# Patient Record
Sex: FEMALE | Race: WHITE | Employment: STUDENT | URBAN - METROPOLITAN AREA
[De-identification: names, ages, dates, MRNs, and addresses within clinical notes are randomized per-mention and may not be internally consistent; named-entity substitution may affect disease eponyms.]

---
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
## 2022-01-01 NOTE — PROGRESS NOTES
Attending Note:   I have examined this patient and have reviewed the clinical presentation and progress note with the fellow. I agree with the treatment plan as outlined. The plan was formulated with the fellow on the day of the patient's visit.  Jany Gallo MD, CAQ, CCD  [facility]  Sports Medicine and Bone Health

## 2022-01-02 DIAGNOSIS — U07.1 CLINICAL DIAGNOSIS OF COVID-19: ICD-10-CM

## 2022-01-06 ENCOUNTER — LAB (OUTPATIENT)
Dept: LAB | Facility: CLINIC | Age: 22
End: 2022-01-06
Attending: FAMILY MEDICINE
Payer: COMMERCIAL

## 2022-01-06 DIAGNOSIS — U07.1 CLINICAL DIAGNOSIS OF COVID-19: ICD-10-CM

## 2022-01-06 LAB
ATRIAL RATE - MUSE: 65 BPM
DIASTOLIC BLOOD PRESSURE - MUSE: NORMAL MMHG
INTERPRETATION ECG - MUSE: NORMAL
P AXIS - MUSE: 8 DEGREES
PR INTERVAL - MUSE: 152 MS
QRS DURATION - MUSE: 72 MS
QT - MUSE: 374 MS
QTC - MUSE: 388 MS
R AXIS - MUSE: 78 DEGREES
SYSTOLIC BLOOD PRESSURE - MUSE: NORMAL MMHG
T AXIS - MUSE: 36 DEGREES
TROPONIN I SERPL HS-MCNC: <3 NG/L
VENTRICULAR RATE- MUSE: 65 BPM

## 2022-01-06 PROCEDURE — 36415 COLL VENOUS BLD VENIPUNCTURE: CPT | Performed by: PATHOLOGY

## 2022-01-06 PROCEDURE — 84484 ASSAY OF TROPONIN QUANT: CPT | Performed by: PATHOLOGY

## 2022-01-07 ENCOUNTER — OFFICE VISIT (OUTPATIENT)
Dept: FAMILY MEDICINE | Facility: CLINIC | Age: 22
End: 2022-01-07
Payer: COMMERCIAL

## 2022-01-07 VITALS — HEIGHT: 60 IN | BODY MASS INDEX: 22.62 KG/M2

## 2022-01-07 DIAGNOSIS — U07.1 CLINICAL DIAGNOSIS OF COVID-19: Primary | ICD-10-CM

## 2022-01-07 NOTE — PROGRESS NOTES
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     Tested positive on 12/31. She has only had some congestion which is improving.  No fevers or chills. No chest pain or shortness of breath. No loss of taste, loss of smell, nausea, vomiting or diarrhea.    She does have asthma and has been doing her nebulizer and she has been good about taking it. She hasn't been any extra rescue inhaler and doesn't feel her asthma has flared at all.     She previously had COVID19 in September 2021 and had chest pain after that. She had an echo and Cardiac MRI which were normal. She has not had any recurrent or new chest pain.     Vaccination: Not vaccinated.       PMH, Medications and Allergies were reviewed and updated as needed.    ROS:  As noted above in HPI otherwise negative.    There is no problem list on file for this patient.      Current Outpatient Medications   Medication Sig Dispense Refill     amphetamine-dextroamphetamine (ADDERALL XR) 15 MG 24 hr capsule Take 1 capsule (15 mg) by mouth daily 31 capsule 0     amphetamine-dextroamphetamine (ADDERALL XR) 5 MG 24 hr capsule Take 1 capsule (5 mg) by mouth every morning 31 capsule 0     meloxicam (MOBIC) 15 MG tablet Take 1 tablet (15 mg) by mouth daily for 14 days 14 tablet 0              OBJECTIVE:     Ht 1.524 m (5')   LMP  (LMP Unknown)   BMI 22.62 kg/m    Estimated body mass index is 22.62 kg/m  as calculated from the following:    Height as of this encounter: 1.524 m (5').    Weight as of 12/20/21: 52.5 kg (115 lb 12.8 oz).    GENERAL: Healthy, alert and no distress  EYES: Eyes grossly normal to inspection.  No discharge or erythema, or obvious scleral/conjunctival abnormalities.  Cardiac: Regular rate and rhythm. No murmer, rubs, or gallops.  RESP: No wheeze, rhonchi or rales. No  cough, or visible cyanosis.  No visible retractions or increased work of breathing.    SKIN: Visible skin clear. No significant rash, abnormal pigmentation or lesions.  NEURO: Cranial nerves grossly intact.  Mentation and speech appropriate for age.  PSYCH: Mentation appears normal, affect normal/bright, judgement and insight intact, normal speech and appearance well-groomed.    Troponin I High Sensitivity <54 ng/L <3        EKG: Sinus rhythm   Septal infarct , age undetermined   Abnormal ECG   When compared with ECG of 14-SEP-2021 11:37,   No significant change was found   Confirmed by MD LILA, ORIN ([id number]) on 1/6/2022 10:13:11 PM           ASSESSMENT & PLAN:      Shena was seen today for covid concern.    Diagnoses and all orders for this visit:    Clinical diagnosis of COVID-19: Mild disease in unvaccinated athlete with recent COVID in September. EKG read as possible septal infaract however it appears normal and is unchanged from previous. After previous EKG athlete did have echo and cardiac MRI which were normal. Suspect automated read was incorrect on the EKG.   - Done with Isolation   - Laboratory work negative as noted above including EKG and Troponin  - bike test  - continue masking through day 10 from positive test date    Return to clinic following above testing to review results. Return sooner if develops new or worsening symptoms.    Options for treatment and/or follow-up care were reviewed with the patient and , Sandra Trent ATC. Patient was actively involved in the decision making process. Patient verbalized understanding and was in agreement with the plan.    The patient was discussed with Jany Bray MD Diana Cowdrey, MD

## 2022-01-07 NOTE — LETTER
Date:January 10, 2022      Patient was self referred, no letter generated. Do not send.        [facility] Health Information

## 2022-01-07 NOTE — LETTER
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     Tested positive on 12/31. She has only had some congestion which is improving.  No fevers or chills. No chest pain or shortness of breath. No loss of taste, loss of smell, nausea, vomiting or diarrhea.    She does have asthma and has been doing her nebulizer and she has been good about taking it. She hasn't been any extra rescue inhaler and doesn't feel her asthma has flared at all.     She previously had COVID19 in September 2021 and had chest pain after that. She had an echo and Cardiac MRI which were normal. She has not had any recurrent or new chest pain.     Vaccination: Not vaccinated.       PMH, Medications and Allergies were reviewed and updated as needed.    ROS:  As noted above in HPI otherwise negative.    There is no problem list on file for this patient.      Current Outpatient Medications   Medication Sig Dispense Refill     amphetamine-dextroamphetamine (ADDERALL XR) 15 MG 24 hr capsule Take 1 capsule (15 mg) by mouth daily 31 capsule 0     amphetamine-dextroamphetamine (ADDERALL XR) 5 MG 24 hr capsule Take 1 capsule (5 mg) by mouth every morning 31 capsule 0     meloxicam (MOBIC) 15 MG tablet Take 1 tablet (15 mg) by mouth daily for 14 days 14 tablet 0              OBJECTIVE:     Ht 1.524 m (5')   LMP  (LMP Unknown)   BMI 22.62 kg/m    Estimated body mass index is 22.62 kg/m  as calculated from the following:    Height as of this encounter: 1.524 m (5').    Weight as of 12/20/21: 52.5 kg (115 lb 12.8 oz).    GENERAL: Healthy, alert and no distress  EYES: Eyes grossly normal to inspection.  No discharge or erythema, or obvious scleral/conjunctival abnormalities.  Cardiac: Regular rate and  rhythm. No murmer, rubs, or gallops.  RESP: No wheeze, rhonchi or rales. No cough, or visible cyanosis.  No visible retractions or increased work of breathing.    SKIN: Visible skin clear. No significant rash, abnormal pigmentation or lesions.  NEURO: Cranial nerves grossly intact.  Mentation and speech appropriate for age.  PSYCH: Mentation appears normal, affect normal/bright, judgement and insight intact, normal speech and appearance well-groomed.    Troponin I High Sensitivity <54 ng/L <3        EKG: Sinus rhythm   Septal infarct , age undetermined   Abnormal ECG   When compared with ECG of 14-SEP-2021 11:37,   No significant change was found   Confirmed by MD LILA, ORIN ([id number]) on 1/6/2022 10:13:11 PM           ASSESSMENT & PLAN:      Shena was seen today for covid concern.    Diagnoses and all orders for this visit:    Clinical diagnosis of COVID-19: Mild disease in unvaccinated athlete with recent COVID in September. EKG read as possible septal infaract however it appears normal and is unchanged from previous. After previous EKG athlete did have echo and cardiac MRI which were normal. Suspect automated read was incorrect on the EKG.   - Done with Isolation   - Laboratory work negative as noted above including EKG and Troponin  - bike test  - continue masking through day 10 from positive test date    Return to clinic following above testing to review results. Return sooner if develops new or worsening symptoms.    Options for treatment and/or follow-up care were reviewed with the patient and , Sandra Trent ATC. Patient was actively involved in the decision making process. Patient verbalized understanding and was in agreement with the plan.    The patient was discussed with Jany Bray MD Diana Cowdrey, MD    Attending Note:   I have discussed this patient and have reviewed the clinical presentation and progress note with the fellow. I agree with the treatment plan as  outlined. The plan was formulated with the fellow on the day of the patient's visit.   Jany Gallo MD, CAQ, CCD  [facility]  Sports Medicine and Bone Health      Margarita Diego MD

## 2022-01-09 NOTE — PROGRESS NOTES
Attending Note:   I have discussed this patient and have reviewed the clinical presentation and progress note with the fellow. I agree with the treatment plan as outlined. The plan was formulated with the fellow on the day of the patient's visit.   Jany Gallo MD, CAQ, CCD  [facility]  Sports Medicine and Bone Health

## 2022-01-18 DIAGNOSIS — F90.9 ATTENTION DEFICIT HYPERACTIVITY DISORDER (ADHD), UNSPECIFIED ADHD TYPE: ICD-10-CM

## 2022-01-18 RX ORDER — DEXTROAMPHETAMINE SACCHARATE, AMPHETAMINE ASPARTATE MONOHYDRATE, DEXTROAMPHETAMINE SULFATE AND AMPHETAMINE SULFATE 3.75; 3.75; 3.75; 3.75 MG/1; MG/1; MG/1; MG/1
15 CAPSULE, EXTENDED RELEASE ORAL DAILY
Qty: 31 CAPSULE | Refills: 0 | Status: SHIPPED | OUTPATIENT
Start: 2022-01-18 | End: 2022-09-07

## 2022-01-31 ENCOUNTER — OFFICE VISIT (OUTPATIENT)
Dept: FAMILY MEDICINE | Facility: CLINIC | Age: 22
End: 2022-01-31
Payer: COMMERCIAL

## 2022-01-31 VITALS
DIASTOLIC BLOOD PRESSURE: 77 MMHG | BODY MASS INDEX: 23.4 KG/M2 | HEIGHT: 60 IN | SYSTOLIC BLOOD PRESSURE: 109 MMHG | HEART RATE: 85 BPM | WEIGHT: 119.2 LBS

## 2022-01-31 DIAGNOSIS — F90.9 ATTENTION DEFICIT HYPERACTIVITY DISORDER (ADHD), UNSPECIFIED ADHD TYPE: ICD-10-CM

## 2022-01-31 RX ORDER — DEXTROAMPHETAMINE SACCHARATE, AMPHETAMINE ASPARTATE MONOHYDRATE, DEXTROAMPHETAMINE SULFATE AND AMPHETAMINE SULFATE 3.75; 3.75; 3.75; 3.75 MG/1; MG/1; MG/1; MG/1
15 CAPSULE, EXTENDED RELEASE ORAL DAILY
Qty: 30 CAPSULE | Refills: 0 | Status: SHIPPED | OUTPATIENT
Start: 2022-03-03 | End: 2022-09-13 | Stop reason: DRUGHIGH

## 2022-01-31 RX ORDER — DEXTROAMPHETAMINE SACCHARATE, AMPHETAMINE ASPARTATE MONOHYDRATE, DEXTROAMPHETAMINE SULFATE AND AMPHETAMINE SULFATE 3.75; 3.75; 3.75; 3.75 MG/1; MG/1; MG/1; MG/1
15 CAPSULE, EXTENDED RELEASE ORAL DAILY
Qty: 30 CAPSULE | Refills: 0 | Status: SHIPPED | OUTPATIENT
Start: 2022-04-03 | End: 2022-09-13

## 2022-01-31 RX ORDER — DEXTROAMPHETAMINE SACCHARATE, AMPHETAMINE ASPARTATE MONOHYDRATE, DEXTROAMPHETAMINE SULFATE AND AMPHETAMINE SULFATE 3.75; 3.75; 3.75; 3.75 MG/1; MG/1; MG/1; MG/1
15 CAPSULE, EXTENDED RELEASE ORAL DAILY
Qty: 30 CAPSULE | Refills: 0 | Status: SHIPPED | OUTPATIENT
Start: 2022-01-31 | End: 2022-09-13 | Stop reason: DRUGHIGH

## 2022-01-31 ASSESSMENT — MIFFLIN-ST. JEOR: SCORE: 1237.19

## 2022-01-31 NOTE — PROGRESS NOTES
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  -Doing well on it. It may be a little less effective by the end of the day, but she is still able to concentrate and is happy with how it is working for her.    -Classes going well.  -No problems with her appetite and wt is stable  -No racing heart or funny heartbeats.    Current Outpatient Medications   Medication     amphetamine-dextroamphetamine (ADDERALL XR) 15 MG 24 hr capsule     [START ON 3/3/2022] amphetamine-dextroamphetamine (ADDERALL XR) 15 MG 24 hr capsule     [START ON 4/3/2022] amphetamine-dextroamphetamine (ADDERALL XR) 15 MG 24 hr capsule     amphetamine-dextroamphetamine (ADDERALL XR) 15 MG 24 hr capsule     amphetamine-dextroamphetamine (ADDERALL XR) 5 MG 24 hr capsule     meloxicam (MOBIC) 15 MG tablet     No current facility-administered medications for this visit.     PMH:  No change since her last visit    O: NAD  /77   Pulse 85   Ht 1.524 m (5')   Wt 54.1 kg (119 lb 3.2 oz)   LMP  (LMP Unknown)   BMI 23.28 kg/m    Heart: rrr with m/g  Lungs; CTA      A:  ADHD:  Doing well on Adderall 15 mg XR q day    P:  Refill for one month supply with two additional refills.     Sandra Trent ATC, was present for the entire appt.     Jany Gallo MD, CAQ, FACSM, CCD  [facility]  Sports Medicine and Bone Health  Team Physician;  Athletics

## 2022-01-31 NOTE — LETTER
Date:February 1, 2022      Patient was self referred, no letter generated. Do not send.        [facility] Health Information

## 2022-04-26 ENCOUNTER — VIRTUAL VISIT (OUTPATIENT)
Dept: FAMILY MEDICINE | Facility: CLINIC | Age: 22
End: 2022-04-26
Payer: COMMERCIAL

## 2022-04-26 DIAGNOSIS — F90.9 ATTENTION DEFICIT HYPERACTIVITY DISORDER (ADHD), UNSPECIFIED ADHD TYPE: ICD-10-CM

## 2022-04-26 RX ORDER — DEXTROAMPHETAMINE SACCHARATE, AMPHETAMINE ASPARTATE, DEXTROAMPHETAMINE SULFATE AND AMPHETAMINE SULFATE 3.75; 3.75; 3.75; 3.75 MG/1; MG/1; MG/1; MG/1
15 TABLET ORAL 2 TIMES DAILY
Qty: 60 TABLET | Refills: 0 | Status: SHIPPED | OUTPATIENT
Start: 2022-06-27 | End: 2022-09-13

## 2022-04-26 RX ORDER — DEXTROAMPHETAMINE SACCHARATE, AMPHETAMINE ASPARTATE, DEXTROAMPHETAMINE SULFATE AND AMPHETAMINE SULFATE 3.75; 3.75; 3.75; 3.75 MG/1; MG/1; MG/1; MG/1
15 TABLET ORAL 2 TIMES DAILY
Qty: 60 TABLET | Refills: 0 | Status: SHIPPED | OUTPATIENT
Start: 2022-05-27 | End: 2022-09-13

## 2022-04-26 RX ORDER — DEXTROAMPHETAMINE SACCHARATE, AMPHETAMINE ASPARTATE, DEXTROAMPHETAMINE SULFATE AND AMPHETAMINE SULFATE 3.75; 3.75; 3.75; 3.75 MG/1; MG/1; MG/1; MG/1
15 TABLET ORAL 2 TIMES DAILY
Qty: 60 TABLET | Refills: 0 | Status: SHIPPED | OUTPATIENT
Start: 2022-04-26 | End: 2022-09-13

## 2022-04-26 NOTE — LETTER
4/26/2022      RE: Shena Gomez  [address] [place] [id number]     Dear Colleague,    Thank you for referring your patient, Shena Gomez, to the [facility] ATHLETIC [facility]IN[facility]. Please see a copy of my visit note below.      Shena is a 21 year old who is being evaluated via a billable video visit.      How would you like to obtain your AVS? Per ATC  If the video visit is dropped, the invitation should be resent by: ATC  Will anyone else be joining your video visit? Sandra Trent ATC      Video Start Time: 2:10      Video-Visit Details    Type of service:  Video Visit    Video End Time: 2:20    Originating Location (pt. Location): home  Distant Location (provider location):  [facility] ATHLETIC [facility]     Platform used for Video Visit: [employer]    VIDEO APPOINTMENT    S:  F/u ADHD.   -Taking Adderall 15 mg XR and feels like it doesn't last long enough.    -No side effects  -Sick at home today with a stomach bug (neg COVID test) so could only do a video visit. She is out of meds.   -Adderall doesn't suppress her appetite much  -Wt has been stable      O:  NAD  No vital signs taken due to a video visit      A:  ADHD  P:  Will switch to Adderall 15 mg BID and stop the XR version.   Refills given.  Will need to have updated vital signs with ATC and she can stop by the training room clinic and I will listen to her heart.       Sandra Trent ATC for  Gymnastics was present for the entire video appt.     Jany Gallo MD, CAQ, FACSM, CCD  [facility]  Sports Medicine and Bone Health  Team Physician;  Athletics        Again, thank you for allowing me to participate in the care of your patient.      Sincerely,    Jany Gallo MD

## 2022-04-26 NOTE — LETTER
Date:May 2, 2022      Patient was self referred, no letter generated. Do not send.        [facility] Health Information

## 2022-05-02 NOTE — PROGRESS NOTES
Shena is a 21 year old who is being evaluated via a billable video visit.      How would you like to obtain your AVS? Per ATC  If the video visit is dropped, the invitation should be resent by: ATC  Will anyone else be joining your video visit? Sandra Trent ATC      Video Start Time: 2:10      Video-Visit Details    Type of service:  Video Visit    Video End Time: 2:20    Originating Location (pt. Location): home  Distant Location (provider location):  [facility]     Platform used for Video Visit: [employer]    VIDEO APPOINTMENT    S:  F/u ADHD.   -Taking Adderall 15 mg XR and feels like it doesn't last long enough.    -No side effects  -Sick at home today with a stomach bug (neg COVID test) so could only do a video visit. She is out of meds.   -Adderall doesn't suppress her appetite much  -Wt has been stable      O:  NAD  No vital signs taken due to a video visit      A:  ADHD  P:  Will switch to Adderall 15 mg BID and stop the XR version.   Refills given.  Will need to have updated vital signs with ATC and she can stop by the training room clinic and I will listen to her heart.       Sandra Trent ATC for  Gymnastics was present for the entire video appt.     Jany Gallo MD, CAQ, FACSM, CCD  [facility]  Sports Medicine and Bone Health  Team Physician;  Athletics

## 2022-09-07 DIAGNOSIS — F90.9 ATTENTION DEFICIT HYPERACTIVITY DISORDER (ADHD), UNSPECIFIED ADHD TYPE: ICD-10-CM

## 2022-09-07 RX ORDER — DEXTROAMPHETAMINE SACCHARATE, AMPHETAMINE ASPARTATE MONOHYDRATE, DEXTROAMPHETAMINE SULFATE AND AMPHETAMINE SULFATE 3.75; 3.75; 3.75; 3.75 MG/1; MG/1; MG/1; MG/1
15 CAPSULE, EXTENDED RELEASE ORAL DAILY
Qty: 31 CAPSULE | Refills: 0 | Status: SHIPPED | OUTPATIENT
Start: 2022-09-07 | End: 2022-09-13

## 2022-09-21 DIAGNOSIS — F90.9 ATTENTION DEFICIT HYPERACTIVITY DISORDER (ADHD), UNSPECIFIED ADHD TYPE: ICD-10-CM

## 2022-09-21 DIAGNOSIS — F90.9 ATTENTION DEFICIT HYPERACTIVITY DISORDER (ADHD), UNSPECIFIED ADHD TYPE: Primary | ICD-10-CM

## 2022-09-21 RX ORDER — DEXTROAMPHETAMINE SACCHARATE, AMPHETAMINE ASPARTATE, DEXTROAMPHETAMINE SULFATE AND AMPHETAMINE SULFATE 3.75; 3.75; 3.75; 3.75 MG/1; MG/1; MG/1; MG/1
15 TABLET ORAL DAILY
Qty: 30 TABLET | Refills: 0 | Status: CANCELLED | OUTPATIENT
Start: 2022-11-22 | End: 2022-12-22

## 2022-09-21 RX ORDER — DEXTROAMPHETAMINE SACCHARATE, AMPHETAMINE ASPARTATE, DEXTROAMPHETAMINE SULFATE AND AMPHETAMINE SULFATE 3.75; 3.75; 3.75; 3.75 MG/1; MG/1; MG/1; MG/1
15 TABLET ORAL DAILY
Qty: 30 TABLET | Refills: 0 | Status: CANCELLED | OUTPATIENT
Start: 2022-10-22 | End: 2022-11-21

## 2022-09-21 RX ORDER — DEXTROAMPHETAMINE SACCHARATE, AMPHETAMINE ASPARTATE, DEXTROAMPHETAMINE SULFATE AND AMPHETAMINE SULFATE 3.75; 3.75; 3.75; 3.75 MG/1; MG/1; MG/1; MG/1
15 TABLET ORAL 2 TIMES DAILY
Qty: 62 TABLET | Refills: 0 | Status: CANCELLED | OUTPATIENT
Start: 2022-09-21 | End: 2022-10-21

## 2022-09-21 RX ORDER — DEXTROAMPHETAMINE SACCHARATE, AMPHETAMINE ASPARTATE, DEXTROAMPHETAMINE SULFATE AND AMPHETAMINE SULFATE 3.75; 3.75; 3.75; 3.75 MG/1; MG/1; MG/1; MG/1
15 TABLET ORAL 2 TIMES DAILY
Qty: 60 TABLET | Refills: 0 | Status: SHIPPED | OUTPATIENT
Start: 2022-09-26 | End: 2022-10-26

## 2022-09-21 RX ORDER — DEXTROAMPHETAMINE SACCHARATE, AMPHETAMINE ASPARTATE, DEXTROAMPHETAMINE SULFATE AND AMPHETAMINE SULFATE 3.75; 3.75; 3.75; 3.75 MG/1; MG/1; MG/1; MG/1
15 TABLET ORAL 2 TIMES DAILY
Qty: 60 TABLET | Refills: 0 | Status: SHIPPED | OUTPATIENT
Start: 2022-11-27 | End: 2022-12-27

## 2022-09-21 RX ORDER — DEXTROAMPHETAMINE SACCHARATE, AMPHETAMINE ASPARTATE, DEXTROAMPHETAMINE SULFATE AND AMPHETAMINE SULFATE 3.75; 3.75; 3.75; 3.75 MG/1; MG/1; MG/1; MG/1
15 TABLET ORAL 2 TIMES DAILY
Qty: 60 TABLET | Refills: 0 | Status: SHIPPED | OUTPATIENT
Start: 2022-10-27 | End: 2022-11-26

## 2022-09-21 RX ORDER — DEXTROAMPHETAMINE SACCHARATE, AMPHETAMINE ASPARTATE, DEXTROAMPHETAMINE SULFATE AND AMPHETAMINE SULFATE 3.75; 3.75; 3.75; 3.75 MG/1; MG/1; MG/1; MG/1
15 TABLET ORAL DAILY
Qty: 30 TABLET | Refills: 0 | Status: CANCELLED | OUTPATIENT
Start: 2022-09-21

## 2022-10-25 ENCOUNTER — OFFICE VISIT (OUTPATIENT)
Dept: FAMILY MEDICINE | Facility: CLINIC | Age: 22
End: 2022-10-25
Payer: COMMERCIAL

## 2022-10-25 VITALS
BODY MASS INDEX: 22.81 KG/M2 | SYSTOLIC BLOOD PRESSURE: 125 MMHG | HEART RATE: 114 BPM | HEIGHT: 60 IN | WEIGHT: 116.2 LBS | DIASTOLIC BLOOD PRESSURE: 84 MMHG

## 2022-10-25 DIAGNOSIS — B34.9 VIRAL ILLNESS: ICD-10-CM

## 2022-10-25 DIAGNOSIS — J45.901 MODERATE ASTHMA WITH EXACERBATION, UNSPECIFIED WHETHER PERSISTENT: Primary | ICD-10-CM

## 2022-10-25 RX ORDER — FLUTICASONE PROPIONATE AND SALMETEROL 113; 14 UG/1; UG/1
1 POWDER, METERED RESPIRATORY (INHALATION) 2 TIMES DAILY
Qty: 1 EACH | Refills: 4 | Status: SHIPPED | OUTPATIENT
Start: 2022-10-25 | End: 2023-09-12

## 2022-10-25 RX ORDER — FLUTICASONE PROPIONATE AND SALMETEROL XINAFOATE 230; 21 UG/1; UG/1
1 AEROSOL, METERED RESPIRATORY (INHALATION) 2 TIMES DAILY
Qty: 12 G | Refills: 4 | Status: SHIPPED | OUTPATIENT
Start: 2022-10-25 | End: 2022-10-25 | Stop reason: ALTCHOICE

## 2022-10-25 NOTE — PROGRESS NOTES
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  Sick for one week.  -Needing to use her inhaler more often  -Getting short of breath easily  -Using her neb as well  -Having a cough that was productive but now is more non-productive.   -Coughing some at night but not disturbing her sleep  -No fevers or chills.   -Slight wheezing  -Teammates sick with similar symptoms.   -She was negative for COVID, Flu and Strep at [employer] [facility]  -Needed prednisone in 12/5/2021after taking Z pack for two days without benefit.   -Hard to train floor ex    Current Outpatient Medications   Medication     amphetamine-dextroamphetamine (ADDERALL) 15 MG tablet     [START ON 10/27/2022] amphetamine-dextroamphetamine (ADDERALL) 15 MG tablet     [START ON 11/27/2022] amphetamine-dextroamphetamine (ADDERALL) 15 MG tablet     No current facility-administered medications for this visit.     Albuterol MDI  ALbuterol neb when sick if needed  Budesonide neb BID prescribed by her doctor at home.  Shena admits that she doesn't often use this twice per day.    Singulair    No change in PMH      O:  NAD  /84   Pulse 114   Ht 1.524 m (5')   Wt 52.7 kg (116 lb 3.2 oz)   LMP  (LMP Unknown)   BMI 22.69 kg/m    Sounds congested    HEENT: Neg  Neck: No LAD  Lungs; diffuse wheezing without rhonchi or rales  Heart: rrr without m/g  A:  Viral illness  Asthma Exacerbation      P:  Prednisone 40mg q am for 7 days. Take with food.  Switch to AirDuo 113 one puff bid.  Her insurance doesn't cover Advair and Symbicort.  Discontinue steroid by nebulizer.   Ok to continue using Albuterol MDI and SIngulair.   RTC in 1-2 weeks to assess her progress.    Anderson Rodriguez Do, Sports Medicine Fellow saw and examined the patient as well.     Sandra Trent ATC, was present for the entire appt.     Jany Gallo MD, CAQ, FACSM, [facility]  Sports Medicine and Bone Health  Team Physician;   Athletics

## 2022-10-25 NOTE — LETTER
Date:October 31, 2022      Patient was self referred, no letter generated. Do not send.        [facility] Health Information

## 2022-10-27 RX ORDER — PREDNISONE 20 MG/1
40 TABLET ORAL DAILY
Qty: 14 TABLET | Refills: 0 | Status: SHIPPED | OUTPATIENT
Start: 2022-10-27 | End: 2022-11-03

## 2022-11-01 ENCOUNTER — OFFICE VISIT (OUTPATIENT)
Dept: FAMILY MEDICINE | Facility: CLINIC | Age: 22
End: 2022-11-01
Payer: COMMERCIAL

## 2022-11-01 VITALS
DIASTOLIC BLOOD PRESSURE: 87 MMHG | SYSTOLIC BLOOD PRESSURE: 126 MMHG | BODY MASS INDEX: 23.03 KG/M2 | HEIGHT: 60 IN | HEART RATE: 109 BPM | WEIGHT: 117.3 LBS

## 2022-11-01 DIAGNOSIS — J45.901 MODERATE ASTHMA WITH EXACERBATION, UNSPECIFIED WHETHER PERSISTENT: Primary | ICD-10-CM

## 2022-11-01 NOTE — LETTER
11/1/2022      RE: Shena Gomez  [address] [place] [id number]     Dear Colleague,    Thank you for referring your patient, Shena Gomez, to the [facility]. Please see a copy of my visit note below.    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  -Able to do full floor exercise routine with the tumbling yesterday.  -No SOB  -Drainage cough at night  -Airduo respiclick bid.  Going well.  Rinsing mouth out after use.   -Has about 2 doses of prednisone left.       Current Outpatient Medications   Medication     amphetamine-dextroamphetamine (ADDERALL) 15 MG tablet     [START ON 11/27/2022] amphetamine-dextroamphetamine (ADDERALL) 15 MG tablet     fluticasone-salmeterol (AIRDUO RESPICLICK) 113-14 MCG/ACT inhaler     predniSONE (DELTASONE) 20 MG tablet     No current facility-administered medications for this visit.     O:  NAD  /87   Pulse 109   Ht 1.524 m (5')   Wt 53.2 kg (117 lb 4.8 oz)   LMP  (LMP Unknown)   BMI 22.91 kg/m    Lungs; CTA          A:  Asthma exacerbation    P: Doing well.  Complete the prednisone  Continue the AirDUO inhaler and RTC once she is on the last refill or sooner if needed.     Anderson Rodriguez DO, Sports Medicine Fellow saw and examined the patient.     Sandra Trent ATC, was present for a portion of the appt.     Jany Gallo MD, CAQ, FACSM, CCD  [facility]  Sports Medicine and Bone Health  Team Physician;  Athletics'      Again, thank you for allowing me to participate in the care of your patient.      Sincerely,    Jany Gallo MD

## 2022-11-01 NOTE — PROGRESS NOTES
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  -Able to do full floor exercise routine with the tumbling yesterday.  -No SOB  -Drainage cough at night  -Airduo respiclick bid.  Going well.  Rinsing mouth out after use.   -Has about 2 doses of prednisone left.       Current Outpatient Medications   Medication     amphetamine-dextroamphetamine (ADDERALL) 15 MG tablet     [START ON 11/27/2022] amphetamine-dextroamphetamine (ADDERALL) 15 MG tablet     fluticasone-salmeterol (AIRDUO RESPICLICK) 113-14 MCG/ACT inhaler     predniSONE (DELTASONE) 20 MG tablet     No current facility-administered medications for this visit.     O:  NAD  /87   Pulse 109   Ht 1.524 m (5')   Wt 53.2 kg (117 lb 4.8 oz)   LMP  (LMP Unknown)   BMI 22.91 kg/m    Lungs; CTA          A:  Asthma exacerbation    P: Doing well.  Complete the prednisone  Continue the AirDUO inhaler and RTC once she is on the last refill or sooner if needed.     Anderson Rodriguez DO, Sports Medicine Fellow saw and examined the patient.     Sandra Trent ATC, was present for a portion of the appt.     Jany Gallo MD, CAQ, FACSM, CCD  [facility]  Sports Medicine and Bone Health  Team Physician;  Athletics'

## 2022-11-01 NOTE — LETTER
Date:November 2, 2022      Patient was self referred, no letter generated. Do not send.        [facility] Health Information

## 2023-02-07 DIAGNOSIS — F90.9 ATTENTION DEFICIT HYPERACTIVITY DISORDER (ADHD), UNSPECIFIED ADHD TYPE: Primary | ICD-10-CM

## 2023-02-07 RX ORDER — DEXTROAMPHETAMINE SACCHARATE, AMPHETAMINE ASPARTATE, DEXTROAMPHETAMINE SULFATE AND AMPHETAMINE SULFATE 3.75; 3.75; 3.75; 3.75 MG/1; MG/1; MG/1; MG/1
15 TABLET ORAL 2 TIMES DAILY
Qty: 20 TABLET | Refills: 0 | Status: SHIPPED | OUTPATIENT
Start: 2023-02-07 | End: 2023-02-17

## 2023-02-15 ENCOUNTER — PRE VISIT (OUTPATIENT)
Dept: ORTHOPEDICS | Facility: CLINIC | Age: 23
End: 2023-02-15

## 2023-02-15 ENCOUNTER — OFFICE VISIT (OUTPATIENT)
Dept: ORTHOPEDICS | Facility: CLINIC | Age: 23
End: 2023-02-15
Payer: COMMERCIAL

## 2023-02-15 VITALS
SYSTOLIC BLOOD PRESSURE: 110 MMHG | WEIGHT: 116.4 LBS | BODY MASS INDEX: 22.73 KG/M2 | HEART RATE: 68 BPM | DIASTOLIC BLOOD PRESSURE: 64 MMHG

## 2023-02-15 DIAGNOSIS — J45.901 MODERATE ASTHMA WITH EXACERBATION, UNSPECIFIED WHETHER PERSISTENT: ICD-10-CM

## 2023-02-15 DIAGNOSIS — F90.9 ATTENTION DEFICIT HYPERACTIVITY DISORDER (ADHD), UNSPECIFIED ADHD TYPE: ICD-10-CM

## 2023-02-15 PROCEDURE — 99213 OFFICE O/P EST LOW 20 MIN: CPT | Performed by: FAMILY MEDICINE

## 2023-02-15 RX ORDER — MONTELUKAST SODIUM 10 MG/1
1 TABLET ORAL DAILY
COMMUNITY
Start: 2021-08-16 | End: 2023-03-14

## 2023-02-15 RX ORDER — ALBUTEROL SULFATE 90 UG/1
2 AEROSOL, METERED RESPIRATORY (INHALATION)
COMMUNITY
Start: 2023-02-07 | End: 2024-07-23

## 2023-02-15 RX ORDER — DEXTROAMPHETAMINE SACCHARATE, AMPHETAMINE ASPARTATE, DEXTROAMPHETAMINE SULFATE AND AMPHETAMINE SULFATE 3.75; 3.75; 3.75; 3.75 MG/1; MG/1; MG/1; MG/1
15 TABLET ORAL 2 TIMES DAILY
Qty: 60 TABLET | Refills: 0 | Status: SHIPPED | OUTPATIENT
Start: 2023-03-18 | End: 2023-04-17

## 2023-02-15 RX ORDER — NORETHINDRONE ACETATE AND ETHINYL ESTRADIOL 1.5; 3 MG/1; UG/1
TABLET ORAL
COMMUNITY
Start: 2023-02-13

## 2023-02-15 RX ORDER — DEXTROAMPHETAMINE SACCHARATE, AMPHETAMINE ASPARTATE, DEXTROAMPHETAMINE SULFATE AND AMPHETAMINE SULFATE 3.75; 3.75; 3.75; 3.75 MG/1; MG/1; MG/1; MG/1
15 TABLET ORAL 2 TIMES DAILY
Qty: 60 TABLET | Refills: 0 | Status: SHIPPED | OUTPATIENT
Start: 2023-02-15 | End: 2023-03-17

## 2023-02-15 RX ORDER — DEXTROAMPHETAMINE SACCHARATE, AMPHETAMINE ASPARTATE, DEXTROAMPHETAMINE SULFATE AND AMPHETAMINE SULFATE 3.75; 3.75; 3.75; 3.75 MG/1; MG/1; MG/1; MG/1
15 TABLET ORAL 2 TIMES DAILY
Qty: 60 TABLET | Refills: 0 | Status: SHIPPED | OUTPATIENT
Start: 2023-04-18 | End: 2023-04-28

## 2023-02-15 NOTE — TELEPHONE ENCOUNTER
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

## 2023-02-15 NOTE — LETTER
2/15/2023      RE: Shena Gomez  [address] [place] [address] [place] [address]     Dear Colleague,    Thank you for referring your patient, Shena Gomez, to the [facility] SPORTS MEDICINE CLINIC [place]. Please see a copy of my visit note below.    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    -Med is going well.   -Sometimes doesn't take it at all on Sundays and sometimes uses it only once per day  -Appetite  -Sleeping well  -No problems or side effects  -Needs a refill    No change in PMH since our last visit.     Current Outpatient Medications   Medication     albuterol (PROAIR HFA/PROVENTIL HFA/VENTOLIN HFA) 108 (90 Base) MCG/ACT inhaler     amphetamine-dextroamphetamine (ADDERALL) 15 MG tablet     [START ON 3/18/2023] amphetamine-dextroamphetamine (ADDERALL) 15 MG tablet     [START ON 4/18/2023] amphetamine-dextroamphetamine (ADDERALL) 15 MG tablet     fluticasone-salmeterol (AIRDUO RESPICLICK) 113-14 MCG/ACT inhaler     JUNEL 1.5/30 1.5-30 MG-MCG tablet     montelukast (SINGULAIR) 10 MG tablet     No current facility-administered medications for this visit.         O:  NAD  /64   Pulse 68   Wt 52.8 kg (116 lb 6.4 oz)   BMI 22.73 kg/m    Heart:  rrr without m/g  Lungs; cta      A: ADHD:  Doing well with Adderral     P:  Refills given for 3 months in one month increments.   Adderal 15mg PO bid   RTC in 3 months for refills.       Jany Gallo MD, CAQ, FACSM, [facility]  Sports Medicine and Bone Health  Team Physician;  Athletics            Again, thank you for allowing me to participate in the care of your patient.      Sincerely,    Jany Gallo MD

## 2023-02-15 NOTE — LETTER
Date:February 20, 2023      Patient was self referred, no letter generated. Do not send.        [facility] Health Information

## 2023-02-15 NOTE — PROGRESS NOTES
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    -Med is going well.   -Sometimes doesn't take it at all on Sundays and sometimes uses it only once per day  -Appetite  -Sleeping well  -No problems or side effects  -Needs a refill    No change in PMH since our last visit.     Current Outpatient Medications   Medication     albuterol (PROAIR HFA/PROVENTIL HFA/VENTOLIN HFA) 108 (90 Base) MCG/ACT inhaler     amphetamine-dextroamphetamine (ADDERALL) 15 MG tablet     [START ON 3/18/2023] amphetamine-dextroamphetamine (ADDERALL) 15 MG tablet     [START ON 4/18/2023] amphetamine-dextroamphetamine (ADDERALL) 15 MG tablet     fluticasone-salmeterol (AIRDUO RESPICLICK) 113-14 MCG/ACT inhaler     JUNEL 1.5/30 1.5-30 MG-MCG tablet     montelukast (SINGULAIR) 10 MG tablet     No current facility-administered medications for this visit.         O:  NAD  /64   Pulse 68   Wt 52.8 kg (116 lb 6.4 oz)   BMI 22.73 kg/m    Heart:  rrr without m/g  Lungs; cta      A: ADHD:  Doing well with Adderral     P:  Refills given for 3 months in one month increments.   Adderal 15mg PO bid   RTC in 3 months for refills.       Jany Gallo MD, CAQ, FACSM, [facility]  Sports Medicine and Bone Health  Team Physician;  Athletics

## 2023-03-14 DIAGNOSIS — J45.901 MODERATE ASTHMA WITH EXACERBATION, UNSPECIFIED WHETHER PERSISTENT: Primary | ICD-10-CM

## 2023-03-14 RX ORDER — MONTELUKAST SODIUM 10 MG/1
1 TABLET ORAL DAILY
Qty: 93 TABLET | Refills: 3 | Status: SHIPPED | OUTPATIENT
Start: 2023-03-14 | End: 2024-07-23

## 2023-04-28 DIAGNOSIS — F90.9 ATTENTION DEFICIT HYPERACTIVITY DISORDER (ADHD), UNSPECIFIED ADHD TYPE: ICD-10-CM

## 2023-04-28 RX ORDER — DEXTROAMPHETAMINE SACCHARATE, AMPHETAMINE ASPARTATE, DEXTROAMPHETAMINE SULFATE AND AMPHETAMINE SULFATE 3.75; 3.75; 3.75; 3.75 MG/1; MG/1; MG/1; MG/1
15 TABLET ORAL 2 TIMES DAILY
Qty: 60 TABLET | Refills: 0 | Status: SHIPPED | OUTPATIENT
Start: 2023-04-28 | End: 2023-07-28

## 2023-07-28 DIAGNOSIS — F90.9 ATTENTION DEFICIT HYPERACTIVITY DISORDER (ADHD), UNSPECIFIED ADHD TYPE: ICD-10-CM

## 2023-07-28 RX ORDER — DEXTROAMPHETAMINE SACCHARATE, AMPHETAMINE ASPARTATE, DEXTROAMPHETAMINE SULFATE AND AMPHETAMINE SULFATE 3.75; 3.75; 3.75; 3.75 MG/1; MG/1; MG/1; MG/1
15 TABLET ORAL 2 TIMES DAILY
Qty: 60 TABLET | Refills: 0 | Status: SHIPPED | OUTPATIENT
Start: 2023-07-28 | End: 2023-07-31

## 2023-07-31 DIAGNOSIS — F90.9 ATTENTION DEFICIT HYPERACTIVITY DISORDER (ADHD), UNSPECIFIED ADHD TYPE: ICD-10-CM

## 2023-07-31 RX ORDER — DEXTROAMPHETAMINE SACCHARATE, AMPHETAMINE ASPARTATE, DEXTROAMPHETAMINE SULFATE AND AMPHETAMINE SULFATE 3.75; 3.75; 3.75; 3.75 MG/1; MG/1; MG/1; MG/1
15 TABLET ORAL 2 TIMES DAILY
Qty: 60 TABLET | Refills: 0 | Status: SHIPPED | OUTPATIENT
Start: 2023-07-31 | End: 2023-09-12

## 2023-08-19 NOTE — CONFIDENTIAL NOTE
[facility] ATHLETICS  Blanca VELEZ follow-up note  Date of service performed: 10/29/21    Concern/injury: Concussion    Assessment/plan: Shena came in to practice today and said that she was feeling pretty good. She said that she did not have a headache at all and that she slept better last night. She completed a symptoms checklist with 4 symptoms and a severity of 4.    Sandra Trent ATC       WITH WATER AFTER EACH USE 60 each 5    albuterol (PROVENTIL) (2.5 MG/3ML) 0.083% nebulizer solution Inhale by nebulizer over 5-15 minutes three (3) to four (4) times a day as needed for cough/wheezing/shortness of breath 120 each 5     No current facility-administered medications on file prior to visit.        Past Medical History:   Diagnosis Date    Allergic rhinitis     Allergies     Asthma     Closed dislocation of finger of left hand 2018    Dermoid tumor     Essential hypertension 5/15/2018    Hyperthyroidism     Thyroid disease      Past Surgical History:   Procedure Laterality Date    LAPAROSCOPY Right 10/20/2021    LAPARSCOPIC ADNEXAL MASS REMOVAL WITH  RIGHT SALPINGO OOPHORECTOMY; INTRAUTERINE DEVICE REMOVAL performed by Zora Desir MD at [address] Bilateral 2021    MANDIBLE SURGERY      OVARY REMOVAL Right 10/2021    due to cyst    VENTRAL HERNIA REPAIR N/A 9/15/2022    VENTRAL HERNIA REPAIR WITH MESH performed by Yassine Pierre MD at [address] History     Socioeconomic History    Marital status:      Spouse name: Haily Kerns    Number of children: 0    Years of education: 23    Highest education level: Professional school degree (e.g., MD, DDS, DVM, VIVIENNE)   Occupational History    Occupation: retired   Tobacco Use    Smoking status: Former     Packs/day: 0.25     Years: 5.00     Pack years: 1.25     Types: Cigarettes     Quit date: 1990     Years since quittin.1    Smokeless tobacco: Never   Vaping Use    Vaping Use: Never used   Substance and Sexual Activity    Alcohol use: Not Currently    Drug use: Not Currently    Sexual activity: Yes     Partners: Male     Comment:  and monogamous   Other Topics Concern    Not on file   Social History Narrative    Not on file     Social Determinants of Health     Financial Resource Strain: Low Risk     Difficulty of Paying Living Expenses: Not hard at all   Food Insecurity: No Food Insecurity    Worried About Running Out

## 2023-09-12 ENCOUNTER — OFFICE VISIT (OUTPATIENT)
Dept: FAMILY MEDICINE | Facility: CLINIC | Age: 23
End: 2023-09-12
Payer: COMMERCIAL

## 2023-09-12 VITALS
HEIGHT: 61 IN | SYSTOLIC BLOOD PRESSURE: 116 MMHG | HEART RATE: 68 BPM | WEIGHT: 118 LBS | DIASTOLIC BLOOD PRESSURE: 76 MMHG | BODY MASS INDEX: 22.28 KG/M2

## 2023-09-12 DIAGNOSIS — J45.901 MODERATE ASTHMA WITH EXACERBATION, UNSPECIFIED WHETHER PERSISTENT: ICD-10-CM

## 2023-09-12 DIAGNOSIS — F90.9 ATTENTION DEFICIT HYPERACTIVITY DISORDER (ADHD), UNSPECIFIED ADHD TYPE: ICD-10-CM

## 2023-09-12 RX ORDER — FLUTICASONE PROPIONATE AND SALMETEROL 113; 14 UG/1; UG/1
1 POWDER, METERED RESPIRATORY (INHALATION) 2 TIMES DAILY
Qty: 1 EACH | Refills: 11 | Status: SHIPPED | OUTPATIENT
Start: 2023-09-12 | End: 2023-09-19

## 2023-09-12 RX ORDER — DEXTROAMPHETAMINE SACCHARATE, AMPHETAMINE ASPARTATE, DEXTROAMPHETAMINE SULFATE AND AMPHETAMINE SULFATE 5; 5; 5; 5 MG/1; MG/1; MG/1; MG/1
20 TABLET ORAL 2 TIMES DAILY
Qty: 62 TABLET | Refills: 0 | Status: SHIPPED | OUTPATIENT
Start: 2023-09-14 | End: 2023-09-19

## 2023-09-12 NOTE — PROGRESS NOTES
"S:     1) ADHD follow-up  -Wondering about increasing her dose.  Noticing zoning out and difficulty concentrating at practice and in class.   -No appetite suppression.   -Wt stable  -No CP or racing heart.       2)  Needs a refill of AirDuo Respiclick MDI for asthma.  Doing well.  Doesn't need her rescue inhaler.        O:  NAD  /76   Pulse 68   Ht 1.549 m (5' 1\")   Wt 53.5 kg (118 lb)   LMP  (LMP Unknown)   BMI 22.30 kg/m    Heart; rrr  Lungs: cta      A:    ADHD;  Increase adderall to 20mg bid prn  -A one mouth supply was given. Recheck in 3-4 weeks to see if the increased dose is helping.     2.  Asthma; stable  -Refill given for Santosh Pedersen ATC, was present for the entire appt.     Jany Gallo MD, CAQ, FACSM, CCD  [facility]  Sports Medicine and Bone Health  Team Physician;  Athletics      "

## 2023-09-12 NOTE — LETTER
"  9/12/2023      RE: Shena Gomez  [address] [place] [id number]     Dear Colleague,    Thank you for referring your patient, Shena Gomez, to the [facility] CLINIC. Please see a copy of my visit note below.    S:     1) ADHD follow-up  -Wondering about increasing her dose.  Noticing zoning out and difficulty concentrating at practice and in class.   -No appetite suppression.   -Wt stable  -No CP or racing heart.       2)  Needs a refill of AirDuo Respiclick MDI for asthma.  Doing well.  Doesn't need her rescue inhaler.        O:  NAD  /76   Pulse 68   Ht 1.549 m (5' 1\")   Wt 53.5 kg (118 lb)   LMP  (LMP Unknown)   BMI 22.30 kg/m    Heart; rrr  Lungs: cta      A:    ADHD;  Increase adderall to 20mg bid prn  -A one mouth supply was given. Recheck in 3-4 weeks to see if the increased dose is helping.     2.  Asthma; stable  -Refill given for Santosh Pedersen ATC, was present for the entire appt.     Jany Gallo MD, CAQ, FACSM, CCD  [facility]  Sports Medicine and Bone Health  Team Physician;  Athletics        Again, thank you for allowing me to participate in the care of your patient.      Sincerely,    Jany Gallo MD  "

## 2023-09-19 DIAGNOSIS — J45.901 MODERATE ASTHMA WITH EXACERBATION, UNSPECIFIED WHETHER PERSISTENT: ICD-10-CM

## 2023-09-19 DIAGNOSIS — F90.9 ATTENTION DEFICIT HYPERACTIVITY DISORDER (ADHD), UNSPECIFIED ADHD TYPE: ICD-10-CM

## 2023-09-19 RX ORDER — FLUTICASONE PROPIONATE AND SALMETEROL 113; 14 UG/1; UG/1
1 POWDER, METERED RESPIRATORY (INHALATION) 2 TIMES DAILY
Qty: 1 EACH | Refills: 11 | Status: SHIPPED | OUTPATIENT
Start: 2023-09-19 | End: 2024-02-21

## 2023-09-19 RX ORDER — DEXTROAMPHETAMINE SACCHARATE, AMPHETAMINE ASPARTATE, DEXTROAMPHETAMINE SULFATE AND AMPHETAMINE SULFATE 5; 5; 5; 5 MG/1; MG/1; MG/1; MG/1
20 TABLET ORAL 2 TIMES DAILY
Qty: 62 TABLET | Refills: 0 | Status: SHIPPED | OUTPATIENT
Start: 2023-09-19 | End: 2024-04-23

## 2023-10-23 ENCOUNTER — OFFICE VISIT (OUTPATIENT)
Dept: FAMILY MEDICINE | Facility: CLINIC | Age: 23
End: 2023-10-23
Payer: COMMERCIAL

## 2023-10-23 ENCOUNTER — NURSE TRIAGE (OUTPATIENT)
Dept: NURSING | Facility: CLINIC | Age: 23
End: 2023-10-23
Payer: COMMERCIAL

## 2023-10-23 VITALS
DIASTOLIC BLOOD PRESSURE: 69 MMHG | SYSTOLIC BLOOD PRESSURE: 124 MMHG | HEIGHT: 61 IN | HEART RATE: 100 BPM | WEIGHT: 121.2 LBS | BODY MASS INDEX: 22.88 KG/M2

## 2023-10-23 DIAGNOSIS — S05.8X1A BLUNT TRAUMA OF RIGHT EYE, INITIAL ENCOUNTER: ICD-10-CM

## 2023-10-23 DIAGNOSIS — F90.9 ATTENTION DEFICIT HYPERACTIVITY DISORDER (ADHD), UNSPECIFIED ADHD TYPE: Primary | ICD-10-CM

## 2023-10-23 RX ORDER — DEXTROAMPHETAMINE SACCHARATE, AMPHETAMINE ASPARTATE, DEXTROAMPHETAMINE SULFATE AND AMPHETAMINE SULFATE 5; 5; 5; 5 MG/1; MG/1; MG/1; MG/1
20 TABLET ORAL DAILY
Qty: 30 TABLET | Refills: 0 | Status: SHIPPED | OUTPATIENT
Start: 2023-12-24 | End: 2023-11-11

## 2023-10-23 RX ORDER — DEXTROAMPHETAMINE SACCHARATE, AMPHETAMINE ASPARTATE, DEXTROAMPHETAMINE SULFATE AND AMPHETAMINE SULFATE 5; 5; 5; 5 MG/1; MG/1; MG/1; MG/1
20 TABLET ORAL DAILY
Qty: 30 TABLET | Refills: 0 | Status: SHIPPED | OUTPATIENT
Start: 2023-11-23 | End: 2023-11-11

## 2023-10-23 RX ORDER — DEXTROAMPHETAMINE SACCHARATE, AMPHETAMINE ASPARTATE, DEXTROAMPHETAMINE SULFATE AND AMPHETAMINE SULFATE 5; 5; 5; 5 MG/1; MG/1; MG/1; MG/1
20 TABLET ORAL DAILY
Qty: 30 TABLET | Refills: 0 | Status: SHIPPED | OUTPATIENT
Start: 2023-10-23 | End: 2023-11-11

## 2023-10-23 NOTE — LETTER
"  10/23/2023      RE: Shena Gomez  [address] [place] [id number]     Dear Colleague,    Thank you for referring your patient, Shena Gomez, to the  [facility] CLINIC. Please see a copy of my visit note below.    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  She was sitting in the front row and an errant throw hit her.  She briefly felt like she couldn't see out of the top portion of her eye but that resolved quickly to appearing more dim in that area.  She has a subconjunctival hemorrhage and knows that looks bad but isn't dangerous.  She denies blurry or double vision but is noting eye pain with prolonged focus, peripheral vision and with gymnastics practice this morning.  No other injury. Doesn't wear contacts or glasses. Denies a FB sensation.     Current Outpatient Medications   Medication     albuterol (PROAIR HFA/PROVENTIL HFA/VENTOLIN HFA) 108 (90 Base) MCG/ACT inhaler     amphetamine-dextroamphetamine (ADDERALL) 20 MG tablet     [START ON 11/23/2023] amphetamine-dextroamphetamine (ADDERALL) 20 MG tablet     [START ON 12/24/2023] amphetamine-dextroamphetamine (ADDERALL) 20 MG tablet     amphetamine-dextroamphetamine (ADDERALL) 20 MG tablet     fluticasone-salmeterol (AIRDUO RESPICLICK) 113-14 MCG/ACT inhaler     JUNEL 1.5/30 1.5-30 MG-MCG tablet     montelukast (SINGULAIR) 10 MG tablet     No current facility-administered medications for this visit.     O: NAD  /69   Pulse 100   Ht 1.549 m (5' 1\")   Wt 55 kg (121 lb 3.2 oz)   LMP  (LMP Unknown)   BMI 22.90 kg/m    Visual acuity:   20/15 right eye  20/15 left eye  20/15 both eyes    EOMI, PERRLA with a   Subconjunctival hemorrhage noted in the temporal portion of the sclera  Mild upper lid swelling  Discomfort with convergence  Cornea is clear without evidence of a hyphema    A:  R eye blunt trauma with symptoms concerning for possible retinal " injury.  Normal visual acuity  Subconjunctival hemorrhage    P:  I have recommended the Shena by seen in the Eye Clinic for a full eye examination.  No gymnastics or other exercise until she is seen by Ophthalmology.    I will call the Eye Clinic to help get her an appt.  She understands to go to the ER if her symptoms are worsening.      Sandra Trent ATC, was present for the entire appt.       Jany Gallo MD, CAQ, FACSM, CCD  [facility]  Sports Medicine and Bone Health  Team Physician;  Athletics              Again, thank you for allowing me to participate in the care of your patient.      Sincerely,    Jany Gallo MD

## 2023-10-23 NOTE — TELEPHONE ENCOUNTER
"  Nurse Triage SBAR    Is this a 2nd Level Triage? YES, LICENSED PRACTITIONER REVIEW IS REQUIRED    Situation: patient was a spectator at a high level doge ball game and was hit in the face with the ball yesterday.    Assessment:  Pt is not on the phone currently,  Cleo is calling for an appt for her.  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  She was a spectator at a Doge ball competition and was hit in the face with one Yesterday.  No glasses or contacts  Right eye di=scomfort with \"subltle concerns\" per Dr. Gallo.  Vision 20/15 each eye, per Dr. Gallo  A little hemorrhage to the eye with ongoing discomfort.      Protocol Recommended Disposition:   Routing to EYE for a call back.  Pt available all day tomorrow.   Pt. # [phone]         Routed to provider        Reason for Disposition   Eye pain/discomfort and more than mild    Additional Information   Negative: Severe eye pain   Negative: Complete loss of vision in one or both eyes   Negative: Eyelids are very swollen (shut or almost) and fever   Negative: Eyelid (outer) is very red and fever   Negative: Foreign body sensation ('feels like something is in there') and irrigation didn't help   Negative: Vomiting   Negative: Ulcer or sore seen on the cornea (clear center part of the eye)   Negative: Recent eye surgery and increasing eye pain    Answer Assessment - Initial Assessment Questions  1. ONSET: \"When did the pain start?\" (e.g., minutes, hours, days)      Pt was sitting in the stands at a high level dog ball tournament and was hit in the face with the ball yesterday  2. TIMING: \"Does the pain come and go, or has it been constant since it started?\" (e.g., constant, intermittent, fleeting)  constant  3. SEVERITY: \"How bad is the pain?\"   (Scale 1-10; mild, moderate or severe)    - MILD (1-3): doesn't interfere with normal activities     - MODERATE (4-7): interferes with normal activities or awakens from sleep     - SEVERE (8-10): excruciating pain and patient " "unable to do normal activities      Unable to rate as the patient was not on the call, DR. Jany Gallo calling  4. LOCATION: \"Where does it hurt?\"  (e.g., eyelid, eye, cheekbone)      Right eye  5. CAUSE: \"What do you think is causing the pain?\"      Doge ball hit it  6. VISION: \"Do you have blurred vision or changes in your vision?\"       Vision exam done by someone, Per report 20/15 each eye, little hemorrhage to right eye, with discomfort.    7. EYE DISCHARGE: \"Is there any discharge (pus) from the eye(s)?\"  If yes, ask: \"What color is it?\"       unknown  8. FEVER: \"Do you have a fever?\" If so, ask: \"What is it, how was it measured, and when did it start?\"       no  9. OTHER SYMPTOMS: \"Do you have any other symptoms?\" (e.g., headache, nasal discharge, facial rash)      unknown  10. PREGNANCY: \"Is there any chance you are pregnant?\" \"When was your last menstrual period?\"        unknown    Protocols used: Eye Pain-A-[place]    "

## 2023-10-24 ENCOUNTER — ALLIED HEALTH/NURSE VISIT (OUTPATIENT)
Dept: OPHTHALMOLOGY | Facility: CLINIC | Age: 23
End: 2023-10-24
Attending: OPHTHALMOLOGY
Payer: COMMERCIAL

## 2023-10-24 ENCOUNTER — OFFICE VISIT (OUTPATIENT)
Dept: OPHTHALMOLOGY | Facility: CLINIC | Age: 23
End: 2023-10-24
Payer: COMMERCIAL

## 2023-10-24 DIAGNOSIS — S05.8X1A BLUNT EYE TRAUMA, RIGHT, INITIAL ENCOUNTER: Primary | ICD-10-CM

## 2023-10-24 DIAGNOSIS — H35.61 RETINAL HEMORRHAGE OF RIGHT EYE: ICD-10-CM

## 2023-10-24 DIAGNOSIS — H35.60 RETINAL HEMORRHAGE: Primary | ICD-10-CM

## 2023-10-24 DIAGNOSIS — S05.8X9A: ICD-10-CM

## 2023-10-24 DIAGNOSIS — H11.31 SUBCONJUNCTIVAL HEMORRHAGE OF RIGHT EYE: ICD-10-CM

## 2023-10-24 DIAGNOSIS — S05.01XA CONJUNCTIVAL ABRASION, RIGHT, INITIAL ENCOUNTER: ICD-10-CM

## 2023-10-24 PROCEDURE — 92134 CPTRZ OPH DX IMG PST SGM RTA: CPT | Performed by: OPHTHALMOLOGY

## 2023-10-24 PROCEDURE — 92004 COMPRE OPH EXAM NEW PT 1/>: CPT | Performed by: OPHTHALMOLOGY

## 2023-10-24 ASSESSMENT — VISUAL ACUITY
METHOD: SNELLEN - LINEAR
OS_SC: 20/20
OD_SC+: -1
OD_SC: 20/20

## 2023-10-24 ASSESSMENT — CONF VISUAL FIELD
METHOD: COUNTING FINGERS
OD_NORMAL: 1
OD_INFERIOR_NASAL_RESTRICTION: 0
OD_SUPERIOR_NASAL_RESTRICTION: 0
OS_INFERIOR_TEMPORAL_RESTRICTION: 0
OS_INFERIOR_NASAL_RESTRICTION: 0
OS_SUPERIOR_NASAL_RESTRICTION: 0
OS_NORMAL: 1
OD_SUPERIOR_TEMPORAL_RESTRICTION: 0
OD_INFERIOR_TEMPORAL_RESTRICTION: 0
OS_SUPERIOR_TEMPORAL_RESTRICTION: 0

## 2023-10-24 ASSESSMENT — SLIT LAMP EXAM - LIDS
COMMENTS: NORMAL
COMMENTS: NORMAL

## 2023-10-24 ASSESSMENT — EXTERNAL EXAM - LEFT EYE: OS_EXAM: NORMAL

## 2023-10-24 ASSESSMENT — CUP TO DISC RATIO
OS_RATIO: 0.2
OD_RATIO: 0.2

## 2023-10-24 ASSESSMENT — TONOMETRY
OD_IOP_MMHG: 14
IOP_METHOD: ICARE
OS_IOP_MMHG: 15

## 2023-10-24 ASSESSMENT — EXTERNAL EXAM - RIGHT EYE: OD_EXAM: NORMAL

## 2023-10-24 NOTE — TELEPHONE ENCOUNTER
Blunt trauma few days ago    No current eye pain and no vision changes.    Redness/?subconjunctival hemorrhage    Pt has pressure pain following injury--resolved    Pt has small floater noticed when looking in certain gazes.    Reviewed with Dr. Toussaint/team and able to see today at 10 AM at [facility]    Pt aware of date/time/location at [facility].    Dc Torres RN 9:47 AM 10/24/23

## 2023-10-24 NOTE — PROGRESS NOTES
"HPI  Shena Gomez is a 23 year old female here for injury to the right eye on Saturday 10/21/23.    HPI       Eye Injury Right Eye    In right eye.  Type of trauma is blunt.  Duration of 4 days.  Associated signs and symptoms include redness, photophobia, lid swelling and floaters.  Negative for eye pain, blurred vision, bruising, tearing, flashing lights, eye discharge and loss of consciousness.  Pain was noted as 5/10.  Since onset it is gradually improving. Additional comments: Eye injury, blunt force.             Comments    Saturday morning, hit in right eye by dodge ball.  Redness and purple maroon [place] with sharp pain all at once.  Redness has increased over last 3 days.  Feels pressure around the right eye and straining today.  Floater in vision right eye noticeable when moving right eye around.      Concussion 2 years ago, hit back of head.  Some vision issues that went away after seeing eye doctor for concussion.  Headaches sometimes.  Pain with eye movement at 4-5/10.    KANDACE Carlson 10/24/2023 10:15 AM                Last edited by Precious Cortez CO on 10/24/2023 10:22 AM.      Was looking right when hit from the left side with dodgeball.  For a few minutes saw some superior visual field darkness, which faded then could see it again later that day in dim lighting.  Now doesn't see.  Next day bright red on the white or the eye, used saline drops which caused a foreign body sensation/stinging.  No pain/stinging now.  Feels only \"straining\" pain with eye movement and slight tenderness on the top lid/toward temple.  No diplopia.  No blurry vision, slightly more dim and blurry right eye than left eye after dilation.     PMH: concussion 2 years ago  Past Medical History:   Diagnosis Date    Uncomplicated asthma       POH: no glasses/contact lenses/refractive surgery   No patching/amblyopia   no trauma before today    Oc Meds: none        Assessment & Plan     1. Blunt eye trauma, right, initial " encounter - Right Eye    2. Commotio retinae, initial encounter - Right Eye    3. Retinal hemorrhage of right eye - Right Eye    4. Conjunctival abrasion, right, initial encounter - Right Eye    5. Subconjunctival hemorrhage of right eye - Right Eye      Traumatic commotio retinae with retinal hemorrhage right eye:  Peripheral vision changes correlate with commotio findings in the retina.  No afferent pupillary defect or anterior segment findings. No vitreous hemorrhage/pigment or retinal tear/retinal detachment.  Signs of these discussed and she will call if new vision changes occur.  Photos at [facility] today.  Will follow-up in 1 week for another exam if no changes in interim.    Subconjunctival hemorrhage/ Superficial abrasion of conjunctiva:  no lacerations, no risk of infection  Self resolving  Avoid valsalva/rubbing  Plan: lubricate with preservative-free artificial tear drops, call with increased redness or new pain   -----------------------------------------------------------------------------------       Patient disposition:   Return in about 1 week (around 10/31/2023) for  1 wk , dilate od only for retinal hemorrhage ; optos and mac OCT appt at [facility] anytime today. To call sooner as needed for sooner appointment.    Complete documentation of historical and exam elements from today's encounter can be found in the full encounter summary report (not reduplicated in this progress note). I personally obtained the chief complaint(s) and history of present illness.  I have confirmed and edited as necessary the CC, HPI, PMH/PSH, social history, FMH, ROS, and exam/neuro findings as obtained by the technician or others. I have examined this patient myself and I personally viewed the image(s) and studies listed above and the documentation reflects my findings and interpretation.  I formulated and edited as necessary the assessment and plan and discussed the findings and management plan with the patient and family.      Beulah Toussaint MD

## 2023-10-24 NOTE — NURSING NOTE
Chief Complaints and History of Present Illnesses   Patient presents with    Eye Injury Right Eye     Eye injury, blunt force.      Chief Complaint(s) and History of Present Illness(es)       Eye Injury Right Eye              Laterality: right eye    Type of trauma: blunt    Duration: 4 days    Associated signs and symptoms: redness, photophobia, lid swelling and floaters.  Negative for eye pain, blurred vision, bruising, tearing, flashing lights, eye discharge and loss of consciousness    Pain scale: 5/10    Course: gradually improving    Comments: Eye injury, blunt force.              Comments    Saturday morning, hit in right eye by dodge ball.  Redness and purple maroon [place] with sharp pain all at once.  Redness has increased over last 3 days.  Feels pressure around the right eye and straining today.  Floater in vision right eye noticeable when moving right eye around.      Concussion 2 years ago, hit back of head.  Some vision issues that went away after seeing eye doctor for concussion.  Headaches sometimes.  Pain with eye movement at 4-5/10.    KANDACE Carlson 10/24/2023 10:15 AM

## 2023-10-30 ENCOUNTER — OFFICE VISIT (OUTPATIENT)
Dept: FAMILY MEDICINE | Facility: CLINIC | Age: 23
End: 2023-10-30
Payer: COMMERCIAL

## 2023-10-30 ENCOUNTER — OFFICE VISIT (OUTPATIENT)
Dept: OPHTHALMOLOGY | Facility: CLINIC | Age: 23
End: 2023-10-30
Payer: COMMERCIAL

## 2023-10-30 VITALS
SYSTOLIC BLOOD PRESSURE: 139 MMHG | HEIGHT: 61 IN | DIASTOLIC BLOOD PRESSURE: 88 MMHG | WEIGHT: 118 LBS | BODY MASS INDEX: 22.28 KG/M2

## 2023-10-30 DIAGNOSIS — S05.8X1D BLUNT EYE TRAUMA, RIGHT, SUBSEQUENT ENCOUNTER: Primary | ICD-10-CM

## 2023-10-30 DIAGNOSIS — H35.61 RETINAL HEMORRHAGE OF RIGHT EYE: ICD-10-CM

## 2023-10-30 DIAGNOSIS — H11.31 SUBCONJUNCTIVAL HEMORRHAGE OF RIGHT EYE: ICD-10-CM

## 2023-10-30 DIAGNOSIS — S05.8X1D: ICD-10-CM

## 2023-10-30 DIAGNOSIS — H66.92 LEFT OTITIS MEDIA, UNSPECIFIED OTITIS MEDIA TYPE: Primary | ICD-10-CM

## 2023-10-30 PROCEDURE — 92014 COMPRE OPH EXAM EST PT 1/>: CPT | Performed by: OPHTHALMOLOGY

## 2023-10-30 RX ORDER — AZITHROMYCIN 250 MG/1
TABLET, FILM COATED ORAL
Qty: 6 TABLET | Refills: 0 | Status: SHIPPED | OUTPATIENT
Start: 2023-10-30 | End: 2023-11-04

## 2023-10-30 ASSESSMENT — VISUAL ACUITY
OD_SC+: -1
OS_SC: 20/25
OS_SC+: -1
OD_SC: 20/25
METHOD: SNELLEN - LINEAR

## 2023-10-30 ASSESSMENT — EXTERNAL EXAM - RIGHT EYE: OD_EXAM: NORMAL

## 2023-10-30 ASSESSMENT — SLIT LAMP EXAM - LIDS
COMMENTS: NORMAL
COMMENTS: NORMAL

## 2023-10-30 ASSESSMENT — CONF VISUAL FIELD
OD_SUPERIOR_NASAL_RESTRICTION: 0
OS_NORMAL: 1
OD_SUPERIOR_TEMPORAL_RESTRICTION: 0
OS_SUPERIOR_TEMPORAL_RESTRICTION: 0
OD_INFERIOR_NASAL_RESTRICTION: 0
OS_INFERIOR_TEMPORAL_RESTRICTION: 0
OD_NORMAL: 1
OS_INFERIOR_NASAL_RESTRICTION: 0
OD_INFERIOR_TEMPORAL_RESTRICTION: 0
OS_SUPERIOR_NASAL_RESTRICTION: 0

## 2023-10-30 ASSESSMENT — TONOMETRY
OD_IOP_MMHG: 17
IOP_METHOD: ICARE
OS_IOP_MMHG: 18

## 2023-10-30 ASSESSMENT — EXTERNAL EXAM - LEFT EYE: OS_EXAM: NORMAL

## 2023-10-30 ASSESSMENT — CUP TO DISC RATIO: OD_RATIO: 0.2
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
## 2023-10-30 NOTE — PROGRESS NOTES
HPI  Shena Gomez is a 23 year old female here for follow-up of right eye injury.  HPI       Follow Up    In right eye. Additional comments: Pt here for 6 day follow up on Blunt trauma right eye.              Comments    Pt notes mild improvement since last exam. No new concerns. Pain has improved as well.  ERIK Alonzo on 10/30/2023 at 9:22 AM            Last edited by [place] COA on 10/30/2023  9:22 AM.      Injury occurred on Saturday 10/21/23, with a dodgeball ball from the right side.  Still notes some superior visual field dimness occasionally, but no worse or difference from the first few days of the injury, feels she is more in tuned to noticing it now.  Eye redness improving.  Minimal pain, feels a slight ache in large open spaces like the gym.      PMH: concussion 2 years ago  Past Medical History:   Diagnosis Date    Uncomplicated asthma       POH: no glasses/contact lenses/refractive surgery   No patching/amblyopia   no trauma before 10/21/23    Oc Meds: none      Assessment & Plan     1. Blunt eye trauma, right, subsequent encounter - Right Eye    2. Retinal hemorrhage of right eye - Right Eye    3. Commotio retinae, right, subsequent encounter - Right Eye    4. Subconjunctival hemorrhage of right eye - Right Eye        Traumatic commotio retinae with retinal hemorrhage right eye, both are improving.   Peripheral vision changes correlate with commotio findings in the retina, should improve with more time.     No vitreous hemorrhage/pigment or retinal tear/retinal detachment.  Signs of these reviewed and she will call if new vision changes occur.     Subconjunctival hemorrhage:  improving  May be having some ocular surface disease discomfort  Trial artificial tear drops up to 6x day as needed - use in large open spaces that may be more prone to drafts     -----------------------------------------------------------------------------------       Patient disposition:   Return in about  6 months (around 4/30/2024) for f/u eye trauma od; gonio, don't dilate, OCT nerve (RNFL) OU. To call sooner as needed for sooner appointment.    Complete documentation of historical and exam elements from today's encounter can be found in the full encounter summary report (not reduplicated in this progress note). I personally obtained the chief complaint(s) and history of present illness.  I have confirmed and edited as necessary the CC, HPI, PMH/PSH, social history, FMH, ROS, and exam/neuro findings as obtained by the technician or others. I have examined this patient myself and I personally viewed the image(s) and studies listed above and the documentation reflects my findings and interpretation.  I formulated and edited as necessary the assessment and plan and discussed the findings and management plan with the patient and family.     Beulah Toussaint MD
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
## 2023-11-06 NOTE — PROGRESS NOTES
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  She was sitting in the front row and an errant throw hit her.  She briefly felt like she couldn't see out of the top portion of her eye but that resolved quickly to appearing more dim in that area.  She has a subconjunctival hemorrhage and knows that looks bad but isn't dangerous.  She denies blurry or double vision but is noting eye pain with prolonged focus, peripheral vision and with gymnastics practice this morning.  No other injury. Doesn't wear contacts or glasses. Denies a FB sensation.     Current Outpatient Medications   Medication    albuterol (PROAIR HFA/PROVENTIL HFA/VENTOLIN HFA) 108 (90 Base) MCG/ACT inhaler    amphetamine-dextroamphetamine (ADDERALL) 20 MG tablet    [START ON 11/23/2023] amphetamine-dextroamphetamine (ADDERALL) 20 MG tablet    [START ON 12/24/2023] amphetamine-dextroamphetamine (ADDERALL) 20 MG tablet    amphetamine-dextroamphetamine (ADDERALL) 20 MG tablet    fluticasone-salmeterol (AIRDUO RESPICLICK) 113-14 MCG/ACT inhaler    JUNEL 1.5/30 1.5-30 MG-MCG tablet    montelukast (SINGULAIR) 10 MG tablet     No current facility-administered medications for this visit.     O: NAD  /69   Pulse 100   Ht 1.549 m (5' 1\")   Wt 55 kg (121 lb 3.2 oz)   LMP  (LMP Unknown)   BMI 22.90 kg/m    Visual acuity:   20/15 right eye  20/15 left eye  20/15 both eyes    EOMI, PERRLA with a   Subconjunctival hemorrhage noted in the temporal portion of the sclera  Mild upper lid swelling  Discomfort with convergence  Cornea is clear without evidence of a hyphema    A:  R eye blunt trauma with symptoms concerning for possible retinal injury.  Normal visual acuity  Subconjunctival hemorrhage    P:  I have recommended adrianna Chatterjee by seen in the Eye Clinic for a full eye examination.  No gymnastics or other exercise until she is seen by Ophthalmology.    I will call the Eye " Clinic to help get her an appt.  She understands to go to the ER if her symptoms are worsening.      Sandra Trent ATC, was present for the entire appt.       Jany Gallo MD, CAQ, FACSM, CCD  [facility]  Sports Medicine and Bone Health  Team Physician;  Athletics

## 2023-11-09 ENCOUNTER — OFFICE VISIT (OUTPATIENT)
Dept: FAMILY MEDICINE | Facility: CLINIC | Age: 23
End: 2023-11-09
Payer: COMMERCIAL

## 2023-11-09 VITALS
HEIGHT: 61 IN | WEIGHT: 118.1 LBS | HEART RATE: 100 BPM | SYSTOLIC BLOOD PRESSURE: 111 MMHG | BODY MASS INDEX: 22.3 KG/M2 | DIASTOLIC BLOOD PRESSURE: 70 MMHG

## 2023-11-09 DIAGNOSIS — H69.93 EUSTACHIAN TUBE DYSFUNCTION, BILATERAL: Primary | ICD-10-CM

## 2023-11-09 RX ORDER — FLUTICASONE PROPIONATE 50 MCG
1 SPRAY, SUSPENSION (ML) NASAL DAILY
Start: 2023-11-09
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
## 2023-11-11 DIAGNOSIS — F90.9 ATTENTION DEFICIT HYPERACTIVITY DISORDER (ADHD), UNSPECIFIED ADHD TYPE: Primary | ICD-10-CM

## 2023-11-11 RX ORDER — DEXTROAMPHETAMINE SACCHARATE, AMPHETAMINE ASPARTATE, DEXTROAMPHETAMINE SULFATE AND AMPHETAMINE SULFATE 5; 5; 5; 5 MG/1; MG/1; MG/1; MG/1
20 TABLET ORAL 2 TIMES DAILY
Qty: 60 TABLET | Refills: 0 | Status: SHIPPED | OUTPATIENT
Start: 2023-11-11 | End: 2023-12-11

## 2023-11-11 RX ORDER — DEXTROAMPHETAMINE SACCHARATE, AMPHETAMINE ASPARTATE, DEXTROAMPHETAMINE SULFATE AND AMPHETAMINE SULFATE 5; 5; 5; 5 MG/1; MG/1; MG/1; MG/1
20 TABLET ORAL 2 TIMES DAILY
Qty: 60 TABLET | Refills: 0 | Status: SHIPPED | OUTPATIENT
Start: 2023-12-12 | End: 2024-01-11

## 2023-11-11 RX ORDER — DEXTROAMPHETAMINE SACCHARATE, AMPHETAMINE ASPARTATE, DEXTROAMPHETAMINE SULFATE AND AMPHETAMINE SULFATE 5; 5; 5; 5 MG/1; MG/1; MG/1; MG/1
20 TABLET ORAL 2 TIMES DAILY
Qty: 60 TABLET | Refills: 0 | Status: SHIPPED | OUTPATIENT
Start: 2024-01-12 | End: 2024-01-19

## 2023-12-19 DIAGNOSIS — B00.9 HERPES: Primary | ICD-10-CM

## 2023-12-19 RX ORDER — VALACYCLOVIR HYDROCHLORIDE 500 MG/1
500 TABLET, FILM COATED ORAL 2 TIMES DAILY
Qty: 6 TABLET | Refills: 0 | Status: SHIPPED | OUTPATIENT
Start: 2023-12-19 | End: 2024-02-13

## 2024-01-01 NOTE — LETTER
Date:September 23, 2020      Patient was self referred, no letter generated. Do not send.        [facility] Information       1 1

## 2024-01-19 DIAGNOSIS — F90.9 ATTENTION DEFICIT HYPERACTIVITY DISORDER (ADHD), UNSPECIFIED ADHD TYPE: ICD-10-CM

## 2024-01-19 RX ORDER — DEXTROAMPHETAMINE SACCHARATE, AMPHETAMINE ASPARTATE, DEXTROAMPHETAMINE SULFATE AND AMPHETAMINE SULFATE 5; 5; 5; 5 MG/1; MG/1; MG/1; MG/1
20 TABLET ORAL 2 TIMES DAILY
Qty: 60 TABLET | Refills: 0 | Status: SHIPPED | OUTPATIENT
Start: 2024-01-19 | End: 2024-01-22

## 2024-01-22 ENCOUNTER — OFFICE VISIT (OUTPATIENT)
Dept: FAMILY MEDICINE | Facility: CLINIC | Age: 24
End: 2024-01-22
Payer: COMMERCIAL

## 2024-01-22 VITALS
BODY MASS INDEX: 22.15 KG/M2 | HEIGHT: 61 IN | WEIGHT: 117.3 LBS | SYSTOLIC BLOOD PRESSURE: 116 MMHG | HEART RATE: 70 BPM | DIASTOLIC BLOOD PRESSURE: 72 MMHG

## 2024-01-22 DIAGNOSIS — F90.9 ATTENTION DEFICIT HYPERACTIVITY DISORDER (ADHD), UNSPECIFIED ADHD TYPE: ICD-10-CM

## 2024-01-22 RX ORDER — DEXTROAMPHETAMINE SACCHARATE, AMPHETAMINE ASPARTATE, DEXTROAMPHETAMINE SULFATE AND AMPHETAMINE SULFATE 5; 5; 5; 5 MG/1; MG/1; MG/1; MG/1
20 TABLET ORAL 2 TIMES DAILY
Qty: 60 TABLET | Refills: 0 | Status: SHIPPED | OUTPATIENT
Start: 2024-03-18 | End: 2024-04-17

## 2024-01-22 RX ORDER — DEXTROAMPHETAMINE SACCHARATE, AMPHETAMINE ASPARTATE, DEXTROAMPHETAMINE SULFATE AND AMPHETAMINE SULFATE 5; 5; 5; 5 MG/1; MG/1; MG/1; MG/1
20 TABLET ORAL 2 TIMES DAILY
Qty: 60 TABLET | Refills: 0 | Status: SHIPPED | OUTPATIENT
Start: 2024-02-18 | End: 2024-04-23

## 2024-01-22 ASSESSMENT — ANXIETY QUESTIONNAIRES
2. NOT BEING ABLE TO STOP OR CONTROL WORRYING: NOT AT ALL
7. FEELING AFRAID AS IF SOMETHING AWFUL MIGHT HAPPEN: NOT AT ALL
5. BEING SO RESTLESS THAT IT IS HARD TO SIT STILL: NOT AT ALL
3. WORRYING TOO MUCH ABOUT DIFFERENT THINGS: NOT AT ALL
1. FEELING NERVOUS, ANXIOUS, OR ON EDGE: NOT AT ALL

## 2024-01-22 NOTE — PROGRESS NOTES
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  -Doing well with Adderall   -I sent in a one month prescription on Friday because she would have run out before seeing me today.  -Appetite is good and wt stable.  -No side effects    Current Outpatient Medications   Medication    albuterol (PROAIR HFA/PROVENTIL HFA/VENTOLIN HFA) 108 (90 Base) MCG/ACT inhaler    amphetamine-dextroamphetamine (ADDERALL) 20 MG tablet    amphetamine-dextroamphetamine (ADDERALL) 20 MG tablet    fluticasone (FLONASE) 50 MCG/ACT nasal spray    fluticasone-salmeterol (AIRDUO RESPICLICK) 113-14 MCG/ACT inhaler    JUNEL 1.5/30 1.5-30 MG-MCG tablet    montelukast (SINGULAIR) 10 MG tablet    valACYclovir (VALTREX) 500 MG tablet     No current facility-administered medications for this visit.     No change to PMH        O: NAD  /72   Pulse 70   Ht 1.549 m (5' 1\")   Wt 53.2 kg (117 lb 4.8 oz)   BMI 22.16 kg/m      Heart; rrr without m/g  Lungs; cta          A:  ADHD: Doing well on treatment    P:  I will send in two more months of medication for her.   RTC in 3 months times.     Sandra Trent ATC, was present for the entire appt.       Jany Gallo MD, CAQ, FACSM, CCD  [facility]  Sports Medicine and Bone Health  Team Physician;  Athletics        "

## 2024-01-22 NOTE — LETTER
"  1/22/2024      RE: Shena Gomez  [address] [place] [phone]     Dear Colleague,    Thank you for referring your patient, Shena Gomez, to the [facility]. Please see a copy of my visit note below.    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  -Doing well with Adderall   -I sent in a one month prescription on Friday because she would have run out before seeing me today.  -Appetite is good and wt stable.  -No side effects    Current Outpatient Medications   Medication     albuterol (PROAIR HFA/PROVENTIL HFA/VENTOLIN HFA) 108 (90 Base) MCG/ACT inhaler     amphetamine-dextroamphetamine (ADDERALL) 20 MG tablet     amphetamine-dextroamphetamine (ADDERALL) 20 MG tablet     fluticasone (FLONASE) 50 MCG/ACT nasal spray     fluticasone-salmeterol (AIRDUO RESPICLICK) 113-14 MCG/ACT inhaler     JUNEL 1.5/30 1.5-30 MG-MCG tablet     montelukast (SINGULAIR) 10 MG tablet     valACYclovir (VALTREX) 500 MG tablet     No current facility-administered medications for this visit.     No change to PMH        O: NAD  /72   Pulse 70   Ht 1.549 m (5' 1\")   Wt 53.2 kg (117 lb 4.8 oz)   BMI 22.16 kg/m      Heart; rrr without m/g  Lungs; cta          A:  ADHD: Doing well on treatment    P:  I will send in two more months of medication for her.   RTC in 3 months times.     Sandra Trent ATC, was present for the entire appt.       Jany Gallo MD, CAQ, FACSM, CCD  [facility]  Sports Medicine and Bone Health  Team Physician;  Athletics          Again, thank you for allowing me to participate in the care of your patient.      Sincerely,    Jany Gallo MD  "

## 2024-02-13 DIAGNOSIS — B00.9 HERPES: ICD-10-CM

## 2024-02-13 RX ORDER — VALACYCLOVIR HYDROCHLORIDE 500 MG/1
500 TABLET, FILM COATED ORAL 2 TIMES DAILY
Qty: 6 TABLET | Refills: 3 | Status: SHIPPED | OUTPATIENT
Start: 2024-02-13

## 2024-02-21 DIAGNOSIS — J45.901 MODERATE ASTHMA WITH EXACERBATION, UNSPECIFIED WHETHER PERSISTENT: ICD-10-CM

## 2024-02-21 RX ORDER — FLUTICASONE PROPIONATE AND SALMETEROL 113; 14 UG/1; UG/1
1 POWDER, METERED RESPIRATORY (INHALATION) 2 TIMES DAILY
Qty: 3 EACH | Refills: 3 | Status: SHIPPED | OUTPATIENT
Start: 2024-02-21

## 2024-02-23 DIAGNOSIS — J45.901 MODERATE ASTHMA WITH EXACERBATION, UNSPECIFIED WHETHER PERSISTENT: Primary | ICD-10-CM

## 2024-02-23 RX ORDER — BUDESONIDE AND FORMOTEROL FUMARATE DIHYDRATE 160; 4.5 UG/1; UG/1
1 AEROSOL RESPIRATORY (INHALATION) 2 TIMES DAILY
Qty: 10.2 G | Refills: 4 | Status: SHIPPED | OUTPATIENT
Start: 2024-02-23 | End: 2024-02-23

## 2024-02-23 RX ORDER — BUDESONIDE AND FORMOTEROL FUMARATE DIHYDRATE 160; 4.5 UG/1; UG/1
1 AEROSOL RESPIRATORY (INHALATION) 2 TIMES DAILY
Qty: 10.2 G | Refills: 4 | Status: SHIPPED | OUTPATIENT
Start: 2024-02-23 | End: 2024-07-23

## 2024-04-23 ENCOUNTER — OFFICE VISIT (OUTPATIENT)
Dept: FAMILY MEDICINE | Facility: CLINIC | Age: 24
End: 2024-04-23
Payer: COMMERCIAL

## 2024-04-23 VITALS
SYSTOLIC BLOOD PRESSURE: 126 MMHG | BODY MASS INDEX: 23.16 KG/M2 | HEART RATE: 71 BPM | HEIGHT: 60 IN | DIASTOLIC BLOOD PRESSURE: 79 MMHG | WEIGHT: 118 LBS

## 2024-04-23 DIAGNOSIS — F90.9 ATTENTION DEFICIT HYPERACTIVITY DISORDER (ADHD), UNSPECIFIED ADHD TYPE: ICD-10-CM

## 2024-04-23 RX ORDER — DEXTROAMPHETAMINE SACCHARATE, AMPHETAMINE ASPARTATE, DEXTROAMPHETAMINE SULFATE AND AMPHETAMINE SULFATE 5; 5; 5; 5 MG/1; MG/1; MG/1; MG/1
20 TABLET ORAL 2 TIMES DAILY
Qty: 42 TABLET | Refills: 0 | Status: SHIPPED | OUTPATIENT
Start: 2024-04-23 | End: 2024-05-14

## 2024-04-23 RX ORDER — DEXTROAMPHETAMINE SACCHARATE, AMPHETAMINE ASPARTATE, DEXTROAMPHETAMINE SULFATE AND AMPHETAMINE SULFATE 5; 5; 5; 5 MG/1; MG/1; MG/1; MG/1
20 TABLET ORAL 2 TIMES DAILY
Qty: 62 TABLET | Refills: 0 | Status: SHIPPED | OUTPATIENT
Start: 2024-05-15 | End: 2024-07-27

## 2024-04-23 ASSESSMENT — PATIENT HEALTH QUESTIONNAIRE - PHQ9: SUM OF ALL RESPONSES TO PHQ QUESTIONS 1-9: 0

## 2024-04-23 NOTE — LETTER
4/23/2024      RE: Shena Gomez  [address] [place] [id number]     Dear Colleague,    Thank you for referring your patient, Shena Gomez, to the [facility]. Please see a copy of my visit note below.    [facility]            SUBJECTIVE:     Shena Gomez is a 23 year old female presenting to clinic today for exit physical.     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   Doing well.   No concerns today.   Needs refill of Adderall.   Moving back home to [place] mid May.   Will find a new family doctor.   Has documentation for neuropsych testing for ADHD.      PMH, Medications and Allergies were reviewed and updated as needed.    ROS:  As noted above otherwise negative.    There is no problem list on file for this patient.      Current Outpatient Medications   Medication Sig Dispense Refill     albuterol (PROAIR HFA/PROVENTIL HFA/VENTOLIN HFA) 108 (90 Base) MCG/ACT inhaler Inhale 2 puffs into the lungs       amphetamine-dextroamphetamine (ADDERALL) 20 MG tablet Take 1 tablet (20 mg) by mouth 2 times daily for 21 days 42 tablet 0     [START ON 5/15/2024] amphetamine-dextroamphetamine (ADDERALL) 20 MG tablet Take 1 tablet (20 mg) by mouth 2 times daily for 32 days 62 tablet 0     budesonide-formoterol (SYMBICORT) 160-4.5 MCG/ACT Inhaler Inhale 1 puff into the lungs 2 times daily 10.2 g 4     fluticasone (FLONASE) 50 MCG/ACT nasal spray Spray 1 spray into both nostrils daily       fluticasone-salmeterol (AIRDUO RESPICLICK) 113-14 MCG/ACT inhaler Inhale 1 puff into the lungs 2 times daily 3 each 3     JUNEL 1.5/30 1.5-30 MG-MCG tablet        montelukast (SINGULAIR) 10 MG tablet Take 1 tablet (10 mg) by mouth daily 93 tablet 3     valACYclovir (VALTREX) 500 MG tablet Take 1 tablet (500 mg) by mouth 2 times daily 6 tablet 3              OBJECTIVE:     Vitals:   Vitals:    04/23/24 1123   BP: 126/79   Pulse: 71   Weight: 53.5 kg (118 lb)   Height: 1.524 m  (5')     BMI: Body mass index is 23.05 kg/m .    Gen:  Well nourished and in no acute distress  HEENT: Extraocular movement intact.  Neck: supple  Skin: No rash, erythema, ecchymosis or obvious abnormality  Psych: Euthymic   Neuro: Alert and oriented.          ASSESSMENT & PLAN:      Shena was seen today for medication problem.    Diagnoses and all orders for this visit:    Attention deficit hyperactivity disorder (ADHD), unspecified ADHD type  -     amphetamine-dextroamphetamine (ADDERALL) 20 MG tablet; Take 1 tablet (20 mg) by mouth 2 times daily for 21 days  -     amphetamine-dextroamphetamine (ADDERALL) 20 MG tablet; Take 1 tablet (20 mg) by mouth 2 times daily for 32 days    Exit physical today.   Needs refill of Adderall.   Will provide enough to last her until Mid-June.   Recommend she find a PCP in [place] and have her neuropsych testing available.  Follow-up PRN.    Options for treatment and/or follow-up care were reviewed with the patient was actively involved in the decision making process. Patient verbalized understanding and was in agreement with the plan.    The patient was seen by and discussed with attending physician Dr.Suzanne LUCA Gallo MD, CAQ, CCD, who agrees with the plan unless otherwise stated.    Athlete's  was present for the entire encounter.      Patricia Barcenas  Primary Care Sports Medicine Fellow  [facility]      Attending Note:   I have examined this patient and have reviewed the clinical presentation and progress note with the fellow. I agree with the treatment plan as outlined. The plan was formulated with the fellow on the day of the patient's visit.      Jany Gallo MD, CAQ, CCD  [facility]  Sports Medicine and Bone Health      Again, thank you for allowing me to participate in the care of your patient.      Sincerely,    Jany Gallo MD

## 2024-04-24 NOTE — PROGRESS NOTES
Attending Note:   I have examined this patient and have reviewed the clinical presentation and progress note with the fellow. I agree with the treatment plan as outlined. The plan was formulated with the fellow on the day of the patient's visit.      Jany Gallo MD, CAQ, CCD  [facility]  Sports Medicine and Bone Health

## 2024-04-24 NOTE — PROGRESS NOTES
[facility]            SUBJECTIVE:     Shena Gomez is a 23 year old female presenting to clinic today for exit physical.     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   Doing well.   No concerns today.   Needs refill of Adderall.   Moving back home to [place] mid May.   Will find a new family doctor.   Has documentation for neuropsych testing for ADHD.      PMH, Medications and Allergies were reviewed and updated as needed.    ROS:  As noted above otherwise negative.    There is no problem list on file for this patient.      Current Outpatient Medications   Medication Sig Dispense Refill    albuterol (PROAIR HFA/PROVENTIL HFA/VENTOLIN HFA) 108 (90 Base) MCG/ACT inhaler Inhale 2 puffs into the lungs      amphetamine-dextroamphetamine (ADDERALL) 20 MG tablet Take 1 tablet (20 mg) by mouth 2 times daily for 21 days 42 tablet 0    [START ON 5/15/2024] amphetamine-dextroamphetamine (ADDERALL) 20 MG tablet Take 1 tablet (20 mg) by mouth 2 times daily for 32 days 62 tablet 0    budesonide-formoterol (SYMBICORT) 160-4.5 MCG/ACT Inhaler Inhale 1 puff into the lungs 2 times daily 10.2 g 4    fluticasone (FLONASE) 50 MCG/ACT nasal spray Spray 1 spray into both nostrils daily      fluticasone-salmeterol (AIRDUO RESPICLICK) 113-14 MCG/ACT inhaler Inhale 1 puff into the lungs 2 times daily 3 each 3    JUNEL 1.5/30 1.5-30 MG-MCG tablet       montelukast (SINGULAIR) 10 MG tablet Take 1 tablet (10 mg) by mouth daily 93 tablet 3    valACYclovir (VALTREX) 500 MG tablet Take 1 tablet (500 mg) by mouth 2 times daily 6 tablet 3              OBJECTIVE:     Vitals:   Vitals:    04/23/24 1123   BP: 126/79   Pulse: 71   Weight: 53.5 kg (118 lb)   Height: 1.524 m (5')     BMI: Body mass index is 23.05 kg/m .    Gen:  Well nourished and in no acute distress  HEENT: Extraocular movement intact.  Neck: supple  Skin: No rash, erythema, ecchymosis or obvious abnormality  Psych: Euthymic   Neuro: Alert  and oriented.          ASSESSMENT & PLAN:      Shena was seen today for medication problem.    Diagnoses and all orders for this visit:    Attention deficit hyperactivity disorder (ADHD), unspecified ADHD type  -     amphetamine-dextroamphetamine (ADDERALL) 20 MG tablet; Take 1 tablet (20 mg) by mouth 2 times daily for 21 days  -     amphetamine-dextroamphetamine (ADDERALL) 20 MG tablet; Take 1 tablet (20 mg) by mouth 2 times daily for 32 days    Exit physical today.   Needs refill of Adderall.   Will provide enough to last her until Mid-June.   Recommend she find a PCP in [place] and have her neuropsych testing available.  Follow-up PRN.    Options for treatment and/or follow-up care were reviewed with the patient was actively involved in the decision making process. Patient verbalized understanding and was in agreement with the plan.    The patient was seen by and discussed with attending physician Dr.Suzanne LUCA Gallo MD, CAQ, CCD, who agrees with the plan unless otherwise stated.    Athlete's  was present for the entire encounter.      Patricia Barcenas  Primary Care Sports Medicine Fellow  [facility]

## 2024-07-23 DIAGNOSIS — J45.901 MODERATE ASTHMA WITH EXACERBATION, UNSPECIFIED WHETHER PERSISTENT: ICD-10-CM

## 2024-07-23 RX ORDER — ALBUTEROL SULFATE 90 UG/1
2 AEROSOL, METERED RESPIRATORY (INHALATION) EVERY 6 HOURS PRN
Qty: 18 G | Refills: 1 | Status: SHIPPED | OUTPATIENT
Start: 2024-07-23

## 2024-07-23 RX ORDER — MONTELUKAST SODIUM 10 MG/1
1 TABLET ORAL DAILY
Qty: 93 TABLET | Refills: 3 | Status: SHIPPED | OUTPATIENT
Start: 2024-07-23

## 2024-07-23 RX ORDER — BUDESONIDE AND FORMOTEROL FUMARATE DIHYDRATE 160; 4.5 UG/1; UG/1
1 AEROSOL RESPIRATORY (INHALATION) 2 TIMES DAILY
Qty: 10.2 G | Refills: 1 | Status: SHIPPED | OUTPATIENT
Start: 2024-07-23

## 2024-07-27 DIAGNOSIS — F90.9 ATTENTION DEFICIT HYPERACTIVITY DISORDER (ADHD), UNSPECIFIED ADHD TYPE: ICD-10-CM

## 2024-07-27 RX ORDER — DEXTROAMPHETAMINE SACCHARATE, AMPHETAMINE ASPARTATE, DEXTROAMPHETAMINE SULFATE AND AMPHETAMINE SULFATE 5; 5; 5; 5 MG/1; MG/1; MG/1; MG/1
20 TABLET ORAL 2 TIMES DAILY
Qty: 62 TABLET | Refills: 0 | Status: SHIPPED | OUTPATIENT
Start: 2024-07-27

## 2025-08-28 ENCOUNTER — LAB (OUTPATIENT)
Dept: LAB | Facility: CLINIC | Age: 25
End: 2025-08-28
Payer: COMMERCIAL

## 2025-08-28 DIAGNOSIS — Z13.0 SCREENING FOR SICKLE-CELL DISEASE OR TRAIT: ICD-10-CM

## 2025-08-28 PROCEDURE — 99000 SPECIMEN HANDLING OFFICE-LAB: CPT | Performed by: PATHOLOGY

## 2025-08-28 PROCEDURE — 83020 HEMOGLOBIN ELECTROPHORESIS: CPT | Performed by: FAMILY MEDICINE

## 2025-09-02 ENCOUNTER — OFFICE VISIT (OUTPATIENT)
Dept: FAMILY MEDICINE | Facility: CLINIC | Age: 25
End: 2025-09-02
Payer: COMMERCIAL

## 2025-09-02 VITALS
WEIGHT: 119.1 LBS | BODY MASS INDEX: 23.38 KG/M2 | HEART RATE: 92 BPM | SYSTOLIC BLOOD PRESSURE: 109 MMHG | DIASTOLIC BLOOD PRESSURE: 70 MMHG | HEIGHT: 60 IN

## 2025-09-02 DIAGNOSIS — Z02.5 SPORTS PHYSICAL: Primary | ICD-10-CM

## 2025-09-02 ASSESSMENT — ANXIETY QUESTIONNAIRES
7. FEELING AFRAID AS IF SOMETHING AWFUL MIGHT HAPPEN: NOT AT ALL
IF YOU CHECKED OFF ANY PROBLEMS ON THIS QUESTIONNAIRE, HOW DIFFICULT HAVE THESE PROBLEMS MADE IT FOR YOU TO DO YOUR WORK, TAKE CARE OF THINGS AT HOME, OR GET ALONG WITH OTHER PEOPLE: NOT DIFFICULT AT ALL
1. FEELING NERVOUS, ANXIOUS, OR ON EDGE: NOT AT ALL
GAD7 TOTAL SCORE: 0
6. BECOMING EASILY ANNOYED OR IRRITABLE: NOT AT ALL
GAD7 TOTAL SCORE: 0
2. NOT BEING ABLE TO STOP OR CONTROL WORRYING: NOT AT ALL
5. BEING SO RESTLESS THAT IT IS HARD TO SIT STILL: NOT AT ALL
3. WORRYING TOO MUCH ABOUT DIFFERENT THINGS: NOT AT ALL

## 2025-09-02 ASSESSMENT — PATIENT HEALTH QUESTIONNAIRE - PHQ9
5. POOR APPETITE OR OVEREATING: NOT AT ALL
SUM OF ALL RESPONSES TO PHQ QUESTIONS 1-9: 0